# Patient Record
Sex: FEMALE | Race: WHITE | NOT HISPANIC OR LATINO | Employment: PART TIME | URBAN - METROPOLITAN AREA
[De-identification: names, ages, dates, MRNs, and addresses within clinical notes are randomized per-mention and may not be internally consistent; named-entity substitution may affect disease eponyms.]

---

## 2017-03-27 ENCOUNTER — HOSPITAL ENCOUNTER (EMERGENCY)
Facility: MEDICAL CENTER | Age: 47
End: 2017-03-27
Attending: EMERGENCY MEDICINE
Payer: COMMERCIAL

## 2017-03-27 VITALS
HEIGHT: 67 IN | HEART RATE: 84 BPM | SYSTOLIC BLOOD PRESSURE: 161 MMHG | RESPIRATION RATE: 16 BRPM | WEIGHT: 259.04 LBS | TEMPERATURE: 97.2 F | DIASTOLIC BLOOD PRESSURE: 91 MMHG | OXYGEN SATURATION: 97 % | BODY MASS INDEX: 40.66 KG/M2

## 2017-03-27 DIAGNOSIS — T14.8XXA MUSCLE TEAR: ICD-10-CM

## 2017-03-27 DIAGNOSIS — M79.604 PAIN OF RIGHT LOWER EXTREMITY: ICD-10-CM

## 2017-03-27 PROCEDURE — 99283 EMERGENCY DEPT VISIT LOW MDM: CPT

## 2017-03-27 ASSESSMENT — PAIN SCALES - GENERAL: PAINLEVEL_OUTOF10: 0

## 2017-03-27 NOTE — ED NOTES
Pt given d/c instructions and prescriptions. ED tech to apply walking boot. All questions answered. Pt verbalized understanding. Pt d/c with .

## 2017-03-27 NOTE — ED NOTES
"WC to triage w/ c/o posterior calf pain.  Pt states she was leaning over this am and felt a \"pop\".   "

## 2017-03-27 NOTE — ED AVS SNAPSHOT
Home Care Instructions                                                                                                                Michelle Cavazos   MRN: [id number]    Department:  [facility], Emergency Dept   Date of Visit:  3/27/2017            [facility], Emergency Dept    [address] [place] [id number]    Phone:  [phone]      You were seen by     Yair Lakhani M.D.      Your Diagnosis Was     Muscle tear     T14.8       Follow-up Information     1. Follow up with [facility], Emergency Dept.    Specialty:  Emergency Medicine    Why:  If symptoms worsen    Contact information    [address] [place] [id number] [phone]        2. Schedule an appointment as soon as possible for a visit with Your Physician.    Specialty:  Emergency Medicine    Contact information    Varies        Medication Information     Review all of your home medications and newly ordered medications with your primary doctor and/or pharmacist as soon as possible. Follow medication instructions as directed by your doctor and/or pharmacist.     Please keep your complete medication list with you and share with your physician. Update the information when medications are discontinued, doses are changed, or new medications (including over-the-counter products) are added; and carry medication information at all times in the event of emergency situations.               Medication List      Notice     You have not been prescribed any medications.              Discharge Instructions       Muscle Tear  A muscle tear is usually caused by over-exertion or stretching. The muscle often takes a while to heal. Muscle tears require 3 to 4 weeks to heal completely. A history of the injury and a physical exam may be performed. Sometimes, the injury is identified with radiographs and an MRI. Treatment for muscle injuries includes:  · Resting and protecting the affected area  until pain with motion is gone.  · Putting ice on the injured area.  ¨ Put ice in a bag.  ¨ Place a towel between your skin and the bag.  ¨ Leave the ice on for 15 to 20 minutes, 3 to 4 times a day.  ¨ After two days, you can use heat to relieve spasms.  · Using compression wraps to help control swelling and limit movement.  · Raising (elevate) the injured area above the level of the heart (if possible) for the first 1 to 2 days after the injury.  · Medicine may be prescribed to reduce pain and inflammation.  Avoid strenuous activities that bring on muscle pain. Exercises to strengthen and stretch the injured muscle can help heal the muscle and prevent repeated injury. After the pain and swelling are gone, you may begin gradual strengthening exercises. Begin range-of-motion exercises and gentle stretching after 3 to 4 days of rest.   SEEK MEDICAL CARE IF:   Your injured muscle is not improving after 1 week of treatment.     This information is not intended to replace advice given to you by your health care provider. Make sure you discuss any questions you have with your health care provider.     Document Released: 01/25/2006 Document Revised: 03/11/2013 Document Reviewed: 07/02/2010  [employer] Interactive Patient Education ©2016 [employer]            Patient Information     Patient Information    Following emergency treatment: all patient requiring follow-up care must return either to a private physician or a clinic if your condition worsens before you are able to obtain further medical attention, please return to the emergency room.     Billing Information    At [facility], we work to make the billing process streamlined for our patients.  Our Representatives are here to answer any questions you may have regarding your hospital bill.  If you have insurance coverage and have supplied your insurance information to us, we will submit a claim to your insurer on your behalf.  Should you have any questions regarding  your bill, we can be reached online or by phone as follows:  Online: You are able pay your bills online or live chat with our representatives about any billing questions you may have. We are here to help Monday - Friday from 8:00am to 7:30pm and 9:00am - 12:00pm on Saturdays.  Please visit [email or web address]  for more information.   Phone:  [phone] or [phone]    Please note that your emergency physician, surgeon, pathologist, radiologist, anesthesiologist, and other specialists are not employed by [facility] and will therefore bill separately for their services.  Please contact them directly for any questions concerning their bills at the numbers below:     Emergency Physician Services:  [phone]  [facility]:  [phone]  [facility]:  [phone]  [facility]:  [phone]    1. Your final bill may vary from the amount quoted upon discharge if all procedures are not complete at that time, or if your doctor has additional procedures of which we are not aware. You will receive an additional bill if you return to the Emergency Department at [facility] for suture removal regardless of the facility of which the sutures were placed.     2. Please arrange for settlement of this account at the emergency registration.    3. All self-pay accounts are due in full at the time of treatment.  If you are unable to meet this obligation then payment is expected within 4-5 days.     4. If you have had radiology studies (CT, X-ray, Ultrasound, MRI), you have received a preliminary result during your emergency department visit. Please contact the radiology department [phone] to receive a copy of your final result. Please discuss the Final result with your primary physician or with the follow up physician provided.     Crisis Hotline:  National Crisis Hotline:  1-737-UCLMWBG or 1-974.246.3044  [place] Crisis Hotline:     0-647-351-1264 or 821-116-6897         ED Discharge Follow Up Questions    1. In order to provide you with very good care, we would like to follow up with a phone call in the next few days.  May we have your permission to contact you?     YES /  NO    2. What is the best phone number to call you? (       )_____-__________    3. What is the best time to call you?      Morning  /  Afternoon  /  Evening                   Patient Signature:  ____________________________________________________________    Date:  ____________________________________________________________

## 2017-03-27 NOTE — DISCHARGE INSTRUCTIONS
Muscle Tear  A muscle tear is usually caused by over-exertion or stretching. The muscle often takes a while to heal. Muscle tears require 3 to 4 weeks to heal completely. A history of the injury and a physical exam may be performed. Sometimes, the injury is identified with radiographs and an MRI. Treatment for muscle injuries includes:  · Resting and protecting the affected area until pain with motion is gone.  · Putting ice on the injured area.  ¨ Put ice in a bag.  ¨ Place a towel between your skin and the bag.  ¨ Leave the ice on for 15 to 20 minutes, 3 to 4 times a day.  ¨ After two days, you can use heat to relieve spasms.  · Using compression wraps to help control swelling and limit movement.  · Raising (elevate) the injured area above the level of the heart (if possible) for the first 1 to 2 days after the injury.  · Medicine may be prescribed to reduce pain and inflammation.  Avoid strenuous activities that bring on muscle pain. Exercises to strengthen and stretch the injured muscle can help heal the muscle and prevent repeated injury. After the pain and swelling are gone, you may begin gradual strengthening exercises. Begin range-of-motion exercises and gentle stretching after 3 to 4 days of rest.   SEEK MEDICAL CARE IF:   Your injured muscle is not improving after 1 week of treatment.     This information is not intended to replace advice given to you by your health care provider. Make sure you discuss any questions you have with your health care provider.     Document Released: 01/25/2006 Document Revised: 03/11/2013 Document Reviewed: 07/02/2010  [employer] Interactive Patient Education ©2016 [employer]

## 2017-03-27 NOTE — ED AVS SNAPSHOT
3/27/2017          Michelle Cavazos  [address] [place] [id number]    Dear Michelle:    [facility] wants to ensure your discharge home is safe and you or your loved ones have had all your questions answered regarding your care after you leave the hospital.    You may receive a telephone call within two days of your discharge.  This call is to make certain you understand your discharge instructions as well as ensure we provided you with the best care possible during your stay with us.     The call will only last approximately 3-5 minutes and will be done by a nurse.    Once again, we want to ensure your discharge home is safe and that you have a clear understanding of any next steps in your care.  If you have any questions or concerns, please do not hesitate to contact us, we are here for you.  Thank you for choosing [facility] for your healthcare needs.    Sincerely,    Stevan Eller    [facility]

## 2017-03-27 NOTE — ED PROVIDER NOTES
"ED Provider Note    Scribed for Yair Lakhani M.D. by Rocael Hill. 3/27/2017, 9:38 AM.    Primary care provider: No primary care provider on file.  Means of arrival: Self  History obtained from: Patient  History limited by: None    CHIEF COMPLAINT  Chief Complaint   Patient presents with   • Leg Pain     r calf, felt a pop       HPI  Michelle Cavazos is a 46 y.o. female who presents to the Emergency Department with right leg pain starting this morning. She states that she was leaning over when she felt a sudden \"pop\" followed by pain. She repots taking 800 mg ibuprofen this morning to manage her pain with some relief. She denies any other medical complaints at this time.     REVIEW OF SYSTEMS  Pertinent positives include leg pain.   Pertinent negatives include no other medical complaints.    E.     PAST MEDICAL HISTORY    None noted    SURGICAL HISTORY  patient denies any surgical history    SOCIAL HISTORY  Social History   Substance Use Topics   • Smoking status: None noted   • Smokeless tobacco: None noted   • Alcohol Use: None noted      History   Drug Use None noted     Patient accompanied by her .     FAMILY HISTORY  None noted for this encounter.    CURRENT MEDICATIONS  No current facility-administered medications for this encounter.  No current outpatient prescriptions on file.    ALLERGIES  No Known Allergies    PHYSICAL EXAM  VITAL SIGNS: /91 mmHg  Pulse 84  Temp(Src) 36.2 °C (97.2 °F) (Temporal)  Resp 16  Ht 1.702 m (5' 7.01\")  Wt 117.5 kg (259 lb 0.7 oz)  BMI 40.56 kg/m2  SpO2 97%    Nursing note and vitals reviewed.  Constitutional: No distress.   HENT: Head is atraumatic. Oropharynx is moist.   Eyes: Conjunctivae are normal. Pupils are equal, round, and reactive to light.   Cardiovascular: Normal peripheral perfusion  Respiratory: No respiratory distress.   Musculoskeletal: Achilles tendon intact. Tenderness of right mid calf, no palpable chords. No deformity noted in the " muscle belly. No tenderness in the thigh.  Neurological: Alert. No focal deficits noted.    Skin: No rash.   Psych: Appropriate for clinical situation     DIAGNOSTIC STUDIES / PROCEDURES    COURSE & MEDICAL DECISION MAKING  Nursing notes, VS, PMSFHx reviewed in chart.    9:38 AM - Patient seen and examined at bedside. Based on her history, i suspect the patient tore her calf muscle. There is no evidence of ruptured achilles tendon or DVT. I will discharge her with a walking boot to aid in her ambulation. The patient will follow-up with her physician when she returns home to [place].    DISPOSITION:  Patient will be discharged home in stable condition.    FOLLOW UP:  [facility], Emergency Dept  [address] [facility] [address] [phone]    If symptoms worsen    Your Physician  Varies    Schedule an appointment as soon as possible for a visit        OUTPATIENT MEDICATIONS:  New Prescriptions    No medications on file       The patient was discharged home with an information sheet on muscle tear and told to return immediately for any signs or symptoms listed.  The patient agreed to the discharge precautions and follow-up plan which is documented in EPIC.    FINAL IMPRESSION  1. Muscle tear    2. Pain of right lower extremity         The note accurately reflects work and decisions made by me.  Yair Lakhani  3/27/2017  3:48 PM

## 2017-03-27 NOTE — ED AVS SNAPSHOT
[employer] Access Code: [email or web address]  Expires: 4/26/2017  9:47 AM    Your email address is not on file at [facility].  Email Addresses are required for you to sign up for [employer], please contact [phone] to verify your personal information and to provide your email address prior to attempting to register for [employer].    Michelle Cavazos  [address] [place] [address]    [employer]  A secure, online tool to manage your health information     [facility]’s [employer]® is a secure, online tool that connects you to your personalized health information from the privacy of your home -- day or night - making it very easy for you to manage your healthcare. Once the activation process is completed, you can even access your medical information using the [employer] dolores, which is available for free in the Apple Dolores store or Google Play store.     To learn more about [employer], visit [email or web address]    There are two levels of access available (as shown below):   My Chart Features  [facility] Primary Care Doctor [facility]  Specialists [facility]  Urgent  Care Non-[facility] Primary Care Doctor   Email your healthcare team securely and privately 24/7 X X X    Manage appointments: schedule your next appointment; view details of past/upcoming appointments X      Request prescription refills. X      View recent personal medical records, including lab and immunizations X X X X   View health record, including health history, allergies, medications X X X X   Read reports about your outpatient visits, procedures, consult and ER notes X X X X   See your discharge summary, which is a recap of your hospital and/or ER visit that includes your diagnosis, lab results, and care plan X X  X     How to register for [employer]:  Once your e-mail address has been verified, follow the following steps to sign up for [employer].     1. Go to  [email or web address]  2. Click on the Sign Up Now box, which takes you to the New Member Sign Up page. You will  need to provide the following information:  a. Enter your [employer] Access Code exactly as it appears at the top of this page. (You will not need to use this code after you’ve completed the sign-up process. If you do not sign up before the expiration date, you must request a new code.)   b. Enter your date of birth.   c. Enter your home email address.   d. Click Submit, and follow the next screen’s instructions.  3. Create a [employer] ID. This will be your [employer] login ID and cannot be changed, so think of one that is secure and easy to remember.  4. Create a [employer]Note password. You can change your password at any time.  5. Enter your Password Reset Question and Answer. This can be used at a later time if you forget your password.   6. Enter your e-mail address. This allows you to receive e-mail notifications when new information is available in [employer].  7. Click Sign Up. You can now view your health information.    For assistance activating your [employer] account, call [phone]